# Patient Record
(demographics unavailable — no encounter records)

---

## 2025-06-04 NOTE — HISTORY OF PRESENT ILLNESS
[FreeTextEntry1] : 81-year-old man with history of lumbar radiculopathy, diabetes mellitus, and stroke presents for bilateral leg cramps. The cramps are not predictable based on walking distance. He does not experience nocturnal rest pain. He has never had non-healing wounds. He does endorse occasional leg swelling. He admits that he has been more sedentary lately than prior.

## 2025-06-04 NOTE — ASSESSMENT
[FreeTextEntry1] : Problem #1 neurogenic claudication likely related to lumbosacral stenosis recommend neurology evaluation - discussed with grandson and son in addition to patient advise regular exercise and stretching no evidence of arterial insufficiency encourage proper hydration and electrolyte intake follow up as needed

## 2025-06-04 NOTE — DATA REVIEWED
[FreeTextEntry1] : R CHUCK 1.31 R TBI 0.71 (toe pressure 110mmHg)  L CHUCK 1.33 L TBI 0.75 (toe pressure 115mmHg)

## 2025-06-04 NOTE — PHYSICAL EXAM
[Respiratory Effort] : normal respiratory effort [Normal Rate and Rhythm] : normal rate and rhythm [0] : left 0 [Ankle Swelling (On Exam)] : not present [Varicose Veins Of Lower Extremities] : not present [] : not present [Abdomen Tenderness] : ~T ~M No abdominal tenderness [Skin Ulcer] : no ulcer [Alert] : alert [Oriented to Person] : oriented to person [Oriented to Place] : oriented to place [Oriented to Time] : oriented to time [Calm] : calm [de-identified] : appears stated age [de-identified] : normocephalic, atraumatic [de-identified] : supple